# Patient Record
Sex: MALE | Race: WHITE | NOT HISPANIC OR LATINO | Employment: OTHER | URBAN - NONMETROPOLITAN AREA
[De-identification: names, ages, dates, MRNs, and addresses within clinical notes are randomized per-mention and may not be internally consistent; named-entity substitution may affect disease eponyms.]

---

## 2023-05-25 ENCOUNTER — OFFICE VISIT (OUTPATIENT)
Dept: FAMILY MEDICINE | Facility: OTHER | Age: 53
End: 2023-05-25
Attending: FAMILY MEDICINE
Payer: COMMERCIAL

## 2023-05-25 ENCOUNTER — HOSPITAL ENCOUNTER (OUTPATIENT)
Dept: GENERAL RADIOLOGY | Facility: OTHER | Age: 53
Discharge: HOME | End: 2023-05-25
Attending: FAMILY MEDICINE
Payer: COMMERCIAL

## 2023-05-25 VITALS
SYSTOLIC BLOOD PRESSURE: 132 MMHG | OXYGEN SATURATION: 98 % | TEMPERATURE: 96.6 F | WEIGHT: 225 LBS | DIASTOLIC BLOOD PRESSURE: 88 MMHG | HEART RATE: 58 BPM | RESPIRATION RATE: 16 BRPM

## 2023-05-25 DIAGNOSIS — M47.818 ARTHROPATHY OF RIGHT SACROILIAC JOINT: Primary | ICD-10-CM

## 2023-05-25 DIAGNOSIS — M62.830 LUMBAR PARASPINAL MUSCLE SPASM: ICD-10-CM

## 2023-05-25 DIAGNOSIS — Z87.19 HISTORY OF GASTROINTESTINAL ULCER: ICD-10-CM

## 2023-05-25 PROCEDURE — 72202 X-RAY EXAM SI JOINTS 3/> VWS: CPT

## 2023-05-25 PROCEDURE — 99215 OFFICE O/P EST HI 40 MIN: CPT | Performed by: FAMILY MEDICINE

## 2023-05-25 RX ORDER — OMEPRAZOLE/SODIUM BICARBONATE 20MG-1.1G
1 CAPSULE ORAL
COMMUNITY
End: 2024-08-20

## 2023-05-25 RX ORDER — CYCLOBENZAPRINE HCL 10 MG
10 TABLET ORAL
Qty: 30 TABLET | Refills: 1 | Status: SHIPPED | OUTPATIENT
Start: 2023-05-25 | End: 2024-08-20

## 2023-05-25 ASSESSMENT — PAIN SCALES - GENERAL: PAINLEVEL: MODERATE PAIN (5)

## 2023-05-25 NOTE — PROGRESS NOTES
Sports Medicine Office Note    HPI:  52-year-old male coming in for evaluation of back pain.  He has had back pain intermittently for several years.  He works in construction/excavating and reports that his job definitely plays into his pain.  Since January 2023 his pain has become much worse.  He reports right-sided lumbar back pain.  This pain is particularly bothersome in the morning.  After he moves for 10-15 minutes his symptoms will slowly improve.  He rates his pain at 4-7/10.  He characterizes the pain as sharp and stabbing.  He has difficulty with sleeping, sitting, standing, and going up stairs.  He is able to do all of the lifting and physical labor required for his job but does note that certain activities will cause increased pain.  He has tried rest, stretching, Biofreeze, Aleve, and massage.  He does report some associated radicular pain.  This is intermittent and involves mostly the right lower extremity, mostly over the anterior thigh, not extending below the knee.      EXAM:  /88 (BP Location: Right arm, Patient Position: Sitting, Cuff Size: Adult Large)   Pulse 58   Temp (!) 96.6  F (35.9  C) (Temporal)   Resp 16   Wt 102.1 kg (225 lb)   SpO2 98%   MUSCULOSKELETAL EXAM:  LOW BACK  Inspection:  -No gross deformity  -No significant scoliosis, kyphosis, or lordosis  -No bruising or swelling  -Able to walk on heel and toes  -Scars:  None    Tenderness to palpation of the:  -Lower thoracic spine:  Negative  -Upper lumbar spine: Mild pain  -Lower lumbar spine:  Negative  -Sacral spine:  Negative  -Left lumbar paraspinal musculature:  Negative  -Right lumbar paraspinal musculature:  Negative  -Left SI joint:  Negative  -Right SI joint: Mild pain  -Left gluteal musculature:  Negative  -Right gluteal musculature:  Negative  -Left greater trochanter:  Negative  -Right greater trochanter:  Negative  -Left anterior hip:  Negative  -Right anterior hip:  Negative    Range of Motion:  -Forward  flexion:  60 with pain at endrange  -Extension:  10 with pain at endrange    Strength:  -Left hip flexion:  5/5  -Right hip flexion:  5/5  -Left hip abduction:  5/5  -Right hip abduction:  5/5  -Left hip adduction:  5/5  -Right hip adduction:  5/5  -Left knee extension:  5/5  -Right knee extension:  5/5  -Left knee flexion:  5/5  -Right knee flexion:  5/5  -Left ankle plantar flexion:  5/5  -Right ankle plantar flexion:  5/5  -Left ankle dorsiflexion:  5/5  -Right ankle dorsiflexion:  5/5  -Left hallux dorsiflexion:  5/5  -Right hallux dorsiflexion:  5/5    Sensation:  -Intact sensation to light touch in the L2-S1 dermatomes    Reflexes:  -Patellar:  Symmetric bilaterally  -Achilles:  Symmetric bilaterally    Special tests:  -Active forward flexion: Painful at endrange  -Active extension: Painful at endrange  -Active side-bending:  Nonpainful bilaterally  -Active twisting: Painful on the right  -Facet loading: Painful on the right  -MARISSA: Painful on the right with bilateral testing  -Stinchfield test:  Negative bilaterally  -Straight leg raise: Negative bilaterally    MUSCULOSKELETAL EXAM:  SACROILIAC JOINT  MARISSA/Popeye test: Positive  Thigh thrust test:  Negative  Compression test: Weakly positive  Distraction test:  Negative  Gaenslen test:  Negative  Andreia finger sign: Positive      IMAGIN2023: 3 view SI joint x-ray  - No fracture, dislocation, or bony lesion.  No significant degenerative changes of the bilateral SI joints.  No significant arthritis changes of the lower lumbar spine as able to be visualized on these radiographs.      ASSESSMENT/PLAN:  Diagnoses and all orders for this visit:  Arthropathy of right sacroiliac joint  -     XR Sacroiliac Joint G/E 3 Views  -     Physical Therapy Referral; Future  Lumbar paraspinal muscle spasm  -     cyclobenzaprine (FLEXERIL) 10 MG tablet; Take 1 tablet (10 mg) by mouth nightly as needed for muscle spasms  -     Physical Therapy Referral;  Future  History of gastrointestinal ulcer    52-year-old male with right SI joint arthropathy with subsequent paraspinal muscle spasm.  He does endorse some mild radicular symptoms but these are intermittent, mild in description, and no exam abnormalities are noted.  X-rays were performed in the office today and personally reviewed by me with the findings as demonstrated above by my interpretation.  We discussed treatment options which include home exercises, physical therapy, topical creams, patches, oral medications, muscle relaxers, and injections.  We will start with less invasive interventions and advance as symptoms necessitate  - Handout given with home exercises for muscle spasm  - Referral placed to physical therapy, patient will establish if home exercises are not providing desired results  - Cyclobenzaprine 10 mg nightly as needed  - Discussed using OTC lidocaine 4% patches (12 hours on, 12 hours off)  - Okay to continue to use Biofreeze  - Use NSAIDs sparingly given history of ulcer  - Follow-up as needed if the above interventions are failing to provide desired symptom relief, consider injection with radiology      Gunner Sales MD  5/25/2023  7:51 AM    Total time spent with this patient was 46 minutes which included chart review, visualization and independent interpretation of images, time spent with the patient, and documentation.    Procedure time:  0 minute(s)

## 2023-07-10 ENCOUNTER — THERAPY VISIT (OUTPATIENT)
Dept: PHYSICAL THERAPY | Facility: OTHER | Age: 53
End: 2023-07-10
Attending: FAMILY MEDICINE
Payer: COMMERCIAL

## 2023-07-10 DIAGNOSIS — M62.830 LUMBAR PARASPINAL MUSCLE SPASM: ICD-10-CM

## 2023-07-10 DIAGNOSIS — M47.818 ARTHROPATHY OF RIGHT SACROILIAC JOINT: ICD-10-CM

## 2023-07-10 PROBLEM — M46.1 ARTHRITIS OF SACROILIAC JOINT (H): Status: ACTIVE | Noted: 2023-07-10

## 2023-07-10 PROCEDURE — 97110 THERAPEUTIC EXERCISES: CPT | Mod: GP

## 2023-07-10 PROCEDURE — 97161 PT EVAL LOW COMPLEX 20 MIN: CPT | Mod: GP

## 2023-07-10 NOTE — PROGRESS NOTES
PHYSICAL THERAPY EVALUATION  Type of Visit: Evaluation    See electronic medical record for Abuse and Falls Screening details.    Subjective      Presenting condition or subjective complaint:   Patient reports he started having SI pain in January -- more so on the R but both will cause trouble. States it got progressively worse and was having numbness/tingling down the back of his leg prior to see MD Domenico. Was prescribed stretches which states have helped but still getting pain with prolonged position and standing, repetitive movements. Patient points to R SI region, R greater trochanter, and L groin for pain points. Patient works as an  and is in/out of machinery all day long. States he has had to limit how active he is because of the pain. Today, sitting rest pain is 1/10. Was taking aleve but no longer taking because upset stomach, reports he hasn't used heat or cold. Denies numbness/tingling currently, denies muscle spasms and change in bowel/bladder. Reports no increased pain with carrying objects unless it is repetitive. Has been doing massage therapy and helps a little x1 month.     Date of onset: 01/01/23    Relevant medical history:   arthritis  Dates & types of surgery:   hand surgery 9 years ago    Prior diagnostic imaging/testing results:     XR SACROILIAC JOINT G/E 3 VIEWS     HISTORY: Arthropathy of right sacroiliac joint .     COMPARISON: None.     TECHNIQUE: 3 views of the sacrum.     FINDINGS:     Mild bilateral SI joint osteophytosis is seen without ankylosis or  subchondral erosion. There is mild facet hypertrophy at L5-S1. There  is mild bilateral femoral head osteophytosis.                                                                      IMPRESSION:      Multifocal degenerative changes.    Prior therapy history for the same diagnosis, illness or injury:     no    Prior Level of Function   Transfers: Independent  Ambulation: Independent  ADL: Independent  IADL: Ind  "with all    Living Environment  Social support:   wife  Type of home:   2-story  Stairs to enter the home:       none  Ramp:     Stairs inside the home:       full flight  Help at home:    Equipment owned:   none    Employment:     self-employed machinest  Hobbies/Interests:      Patient goals for therapy:   \"so I don't hurt\"    Pain assessment: Pain present  See objective evaluation for additional pain details     Objective   LUMBAR SPINE EVALUATION  PAIN: Pain Level at Rest: 1/10  Pain Level with Use: 8/10  Pain Location: lumbar spine  Pain Quality: Aching, Sharp and Shooting  Pain Frequency: intermittent  Pain is Worst: nighttime or sore in the AM but able to work it out  Pain is Exacerbated By: standing, repetitive motion  Pain is Relieved By: rest and stretch  INTEGUMENTARY (edema, incisions): WNL  POSTURE: WNL  GAIT:   Weightbearing Status: WBAT  Assistive Device(s): None  Gait Deviations: Base of support increased  BALANCE/PROPRIOCEPTION: WNL  WEIGHTBEARING ALIGNMENT: WNL  NON-WEIGHTBEARING ALIGNMENT: WNL   ROM:   (Degrees) Left AROM Left PROM  Right AROM Right PROM   Hip Flexion limited  limited    Hip Extension limited  limited    Hip Abduction       Hip Adduction       Hip Internal Rotation limited  limited    Hip External Rotation       Knee Flexion WNL  WNL    Knee Extension WNL  WNL    Lumbar Side glide     Lumbar Flexion WNL   Lumbar Extension WNL +feels like \"good stretch\"   Pain: denies pain with lumbar ROM screen  End feel:     PELVIC/SI SCREEN: WNL  STRENGTH: B hips 5/5 with MMT in all directions; repeated SLR with shakiness/fatigue after 6 reps  MYOTOMES:    Left Right   T12-L3 (Hip Flexion) 5 5   L2-4 (Quads)  5 5   L4 (Ankle DF) 5 5   L5 (Great Toe Ext) 5 5   S1 (Toe Raise) 5 5     NEURAL TENSION:   FLEXIBILITY: Decreased hip IR L, Decreased piriformis L, Decreased hip flexors L, Decreased hamstrings L, Decreased hip IR R, Decreased piriformis R, Decreased hip flexors R, Decreased hamstrings " R  LUMBAR/HIP Special Tests:    Left Right   MARISSA Negative  Negative    FADIR/Labrum/VENKATESH Negative  Negative    Femoral Nerve     Chayo's     Piriformis     Quadrant Testing     SLR positive for greater trochanter pain Negative    Slump     Stork with Extension     Sumeet positive ER for femur positive ER of femur           PELVIS/SI SPECIAL TESTS:    Left Right Additional Notes   ASLR      Gaenslen's Test      Pelvic Compression      Pelvic Gapping      Sacral Thrust      Thigh Thrust        SIJ Palpatory Pelvic position assessed and WNL   Standing forward bend (first/farthest superior movement, note side) WFL   Sitting forward bend (first/farthest superior movement, note side) WFL   Pubic symphysis Superior:  Inferior:      FUNCTIONAL TESTS:   PALPATION: pain posterior to greater trochanter on the R (greater than L); denies pain with palpation of piriformis and lumbar palpation; + for discomfort B SI joints  SPINAL SEGMENTAL CONCLUSIONS: WNL; mobile PA L3-S1 without increased pain    Assessment & Plan   CLINICAL IMPRESSIONS   Medical Diagnosis: M47.818 (ICD-10-CM) - Arthropathy of right sacroiliac joint  M62.830 (ICD-10-CM) - Lumbar paraspinal muscle spasm    Treatment Diagnosis: B hip and SI pain   Impression/Assessment: Patient is a 52 year old male presenting with R sided SI/hip pain and L hip pain. Patient presents with limited ROM in B hips but symmetrical strength; weakness with repeat activity and tenderness to palpation at the greater trochanter and SI joint B. Patient currently limited in standing tolerance and completing repetitive movements necessary for work. POC to address strength/length deficits and activity modification along with core stabilization.    Clinical Decision Making (Complexity):   Clinical Presentation: Stable/Uncomplicated  Clinical Presentation Rationale: based on medical and personal factors listed in PT evaluation  Clinical Decision Making (Complexity): Low complexity    PLAN OF  CARE  Treatment Interventions:  Modalities: Cryotherapy, E-stim, Hot Pack, Ultrasound  Interventions: Gait Training, Manual Therapy, Neuromuscular Re-education, Therapeutic Activity, Therapeutic Exercise    Long Term Goals     PT Goal 1  Goal Identifier: pain  Goal Description: Patient will reports decreased pain to 2/10 after work day within 6 weeks  Target Date: 08/21/23  PT Goal 2  Goal Identifier: standing tolerance  Goal Description: Patient will reports standing tolerance of >1 hour prior to requiring seated rest break due to pain within 6 weeks  Target Date: 08/21/23  PT Goal 3  Goal Identifier: HEP  Goal Description: Patient will be independent with HEP and appropriate progressions within 2 weeks  Target Date: 07/24/23      Frequency of Treatment: 2x week progressingn to 1x week  Duration of Treatment: for 6 weeks    Recommended Referrals to Other Professionals:   Education Assessment:   Learner/Method: Patient;Listening;Reading;Demonstration    Risks and benefits of evaluation/treatment have been explained.   Patient/Family/caregiver agrees with Plan of Care.     Evaluation Time:     PT Eval, Low Complexity Minutes (11687): 30      Signing Clinician: Margi Avery PT

## 2023-07-14 ENCOUNTER — THERAPY VISIT (OUTPATIENT)
Dept: PHYSICAL THERAPY | Facility: OTHER | Age: 53
End: 2023-07-14
Attending: FAMILY MEDICINE
Payer: COMMERCIAL

## 2023-07-14 DIAGNOSIS — M47.818 ARTHROPATHY OF RIGHT SACROILIAC JOINT: Primary | ICD-10-CM

## 2023-07-14 PROCEDURE — 97110 THERAPEUTIC EXERCISES: CPT | Mod: GP

## 2023-07-24 ENCOUNTER — THERAPY VISIT (OUTPATIENT)
Dept: PHYSICAL THERAPY | Facility: OTHER | Age: 53
End: 2023-07-24
Attending: FAMILY MEDICINE
Payer: COMMERCIAL

## 2023-07-24 DIAGNOSIS — M47.818 ARTHROPATHY OF RIGHT SACROILIAC JOINT: Primary | ICD-10-CM

## 2023-07-24 PROCEDURE — 97110 THERAPEUTIC EXERCISES: CPT | Mod: GP

## 2023-07-27 ENCOUNTER — THERAPY VISIT (OUTPATIENT)
Dept: PHYSICAL THERAPY | Facility: OTHER | Age: 53
End: 2023-07-27
Attending: FAMILY MEDICINE
Payer: COMMERCIAL

## 2023-07-27 DIAGNOSIS — M47.818 ARTHROPATHY OF RIGHT SACROILIAC JOINT: Primary | ICD-10-CM

## 2023-07-27 PROCEDURE — 97110 THERAPEUTIC EXERCISES: CPT | Mod: GP

## 2023-07-31 ENCOUNTER — THERAPY VISIT (OUTPATIENT)
Dept: PHYSICAL THERAPY | Facility: OTHER | Age: 53
End: 2023-07-31
Attending: FAMILY MEDICINE
Payer: COMMERCIAL

## 2023-07-31 DIAGNOSIS — M47.818 ARTHROPATHY OF RIGHT SACROILIAC JOINT: Primary | ICD-10-CM

## 2023-07-31 PROCEDURE — 97110 THERAPEUTIC EXERCISES: CPT | Mod: GP

## 2023-07-31 NOTE — PROGRESS NOTES
"   07/31/23 0500   Appointment Info   Signing clinician's name / credentials Margi Avery, PT   Visits Used 5   Medical Diagnosis M47.818 (ICD-10-CM) - Arthropathy of right sacroiliac joint  M62.830 (ICD-10-CM) - Lumbar paraspinal muscle spasm   PT Tx Diagnosis B hip and SI pain   Progress Note/Certification   Onset of illness/injury or Date of Surgery 01/01/23   Therapy Frequency 2x week progressingn to 1x week   Predicted Duration for 6 weeks   GOALS   PT Goals 2;3   PT Goal 1   Goal Identifier pain   Goal Description Patient will reports decreased pain to 2/10 after work day within 6 weeks   Goal Progress Patient reports pain variable is 4/10 today; but was 7/10 yesterday after activity   Target Date 08/21/23   PT Goal 2   Goal Identifier standing tolerance   Goal Description Patient will reports standing tolerance of >1 hour prior to requiring seated rest break due to pain within 6 weeks   Goal Progress Patient reports he will still have pain with standing/walking/working but continues to push himself through the task he needs to do   Target Date 08/21/23   Date Met 07/31/23   PT Goal 3   Goal Identifier HEP   Goal Description Patient will be independent with HEP and appropriate progressions within 2 weeks   Goal Progress Patient continues to perform stretches/strengthening exercises   Target Date 07/24/23   Date Met 07/31/23   Subjective Report   Subjective Report Patient reports he was sore from staining/working over the week -- reports \"I was pretty miserable yesterday\". Patient reports overall he hasn't noticed a difference in function/pain with physical therapy exercises.   Treatment Interventions (PT)   Interventions Therapeutic Procedure/Exercise   Therapeutic Procedure/Exercise   Therapeutic Procedures: strength, endurance, ROM, flexibillity minutes (11259) 45   Ther Proc 1 SciFit xL3 x5 min; lower trunk rotation x10 reps x5 seconds B; figure 4 stretch with crossover x5 seconds x5 reps B; SKTC x5 seconds " "x10 reps; bridges x10 reps with 5 second hold to fatigue; clam shells B to fatigue; trunk stabilization with Blue TB and chest press standing x10 reps B; trunk stabilization with blue TB in chest press with alternating marches and cues to keep hip/shoulders in align (not allow trunk rotation) x15 reps B sides; side stepping with RTB at ankle x length of hallway x4 reps   Skilled Intervention education on POC, goals of therapy and expectations, HEP   Patient Response/Progress reports no increased pain with stretches/exercises but sore muscles with effort to fatigue   Education   Learner/Method Patient;Listening;Reading;Demonstration   Plan   Home program lower trunk rotation, figure 4 stretch; bridge, clam shell, TB trunk stabilization   Plan for next session patient to continue HEP - will follow up with MD for next steps; discharged from PT at this time.   Total Session Time   Timed Code Treatment Minutes 45   Total Treatment Time (sum of timed and untimed services) 45         DISCHARGE  Reason for Discharge: No further expectation of progress.  Patient chooses to discontinue therapy.  Patient reports he continues to do \"what I need to do\" but continues to have low back and B hip pain with activity that is not alleviating with stretching/strengthening and core stabilization exercises.    Equipment Issued: RTB, BTB    Discharge Plan: Patient to continue home program.  Patient to return to referring provider    Referring Provider:  Gunner Acuña MD    "

## 2023-08-09 ENCOUNTER — OFFICE VISIT (OUTPATIENT)
Dept: FAMILY MEDICINE | Facility: OTHER | Age: 53
End: 2023-08-09
Attending: FAMILY MEDICINE
Payer: COMMERCIAL

## 2023-08-09 VITALS
WEIGHT: 225.6 LBS | TEMPERATURE: 96.3 F | RESPIRATION RATE: 16 BRPM | DIASTOLIC BLOOD PRESSURE: 80 MMHG | SYSTOLIC BLOOD PRESSURE: 122 MMHG | HEART RATE: 73 BPM | OXYGEN SATURATION: 99 %

## 2023-08-09 DIAGNOSIS — M47.818 ARTHROPATHY OF RIGHT SACROILIAC JOINT: ICD-10-CM

## 2023-08-09 DIAGNOSIS — M47.818 ARTHROPATHY OF LEFT SACROILIAC JOINT: ICD-10-CM

## 2023-08-09 DIAGNOSIS — M25.552 GREATER TROCHANTERIC PAIN SYNDROME OF LEFT LOWER EXTREMITY: ICD-10-CM

## 2023-08-09 DIAGNOSIS — M62.830 LUMBAR PARASPINAL MUSCLE SPASM: ICD-10-CM

## 2023-08-09 DIAGNOSIS — M25.551 GREATER TROCHANTERIC PAIN SYNDROME OF RIGHT LOWER EXTREMITY: Primary | ICD-10-CM

## 2023-08-09 PROCEDURE — 250N000011 HC RX IP 250 OP 636: Mod: JZ | Performed by: FAMILY MEDICINE

## 2023-08-09 PROCEDURE — 250N000009 HC RX 250: Performed by: FAMILY MEDICINE

## 2023-08-09 PROCEDURE — 99214 OFFICE O/P EST MOD 30 MIN: CPT | Mod: 25 | Performed by: FAMILY MEDICINE

## 2023-08-09 PROCEDURE — 20610 DRAIN/INJ JOINT/BURSA W/O US: CPT | Performed by: FAMILY MEDICINE

## 2023-08-09 RX ORDER — LIDOCAINE HYDROCHLORIDE 10 MG/ML
5 INJECTION, SOLUTION EPIDURAL; INFILTRATION; INTRACAUDAL; PERINEURAL ONCE
Status: COMPLETED | OUTPATIENT
Start: 2023-08-09 | End: 2023-08-09

## 2023-08-09 RX ORDER — DEXAMETHASONE SODIUM PHOSPHATE 10 MG/ML
10 INJECTION, SOLUTION INTRAMUSCULAR; INTRAVENOUS ONCE
Status: COMPLETED | OUTPATIENT
Start: 2023-08-09 | End: 2023-08-09

## 2023-08-09 RX ADMIN — DEXAMETHASONE SODIUM PHOSPHATE 10 MG: 10 INJECTION, SOLUTION INTRAMUSCULAR; INTRAVENOUS at 11:16

## 2023-08-09 RX ADMIN — LIDOCAINE HYDROCHLORIDE 5 ML: 10 INJECTION, SOLUTION INFILTRATION; PERINEURAL at 11:17

## 2023-08-09 RX ADMIN — LIDOCAINE HYDROCHLORIDE 5 ML: 10 INJECTION, SOLUTION INFILTRATION; PERINEURAL at 11:16

## 2023-08-09 ASSESSMENT — PAIN SCALES - GENERAL: PAINLEVEL: MILD PAIN (2)

## 2023-08-09 NOTE — PROGRESS NOTES
Sports Medicine Office Note    HPI:  52-year-old male coming in for follow-up evaluation of lower back pain and bilateral hip pain.  He was last seen in this office on 5/25.  At that time he was referred to physical therapy for lumbar paraspinal muscle spasm and SI joint pain.  He reports 15% improvement in his symptoms.  His current pain is 3-7/10.  He characterizes the pain as dull, stabbing, and aching.  On today's evaluation he seems to have more pain at the lateral hip bilaterally.  No new injuries.      EXAM:  /80 (BP Location: Right arm, Patient Position: Sitting, Cuff Size: Adult Regular)   Pulse 73   Temp (!) 96.3  F (35.7  C) (Temporal)   Resp 16   Wt 102.3 kg (225 lb 9.6 oz)   SpO2 99%   MUSCULOSKELETAL EXAM:  LOW BACK  Inspection:  -No gross deformity  -No significant scoliosis, kyphosis, or lordosis  -No bruising or swelling  -Able to walk on heel and toes  -Scars:  None    Tenderness to palpation of the:  -Lower thoracic spine:  Negative  -Upper lumbar spine:  Negative  -Lower lumbar spine:  Negative  -Sacral spine:  Negative  -Left lumbar paraspinal musculature: Mild pain  -Right lumbar paraspinal musculature: Mild pain  -Left SI joint: Mild pain  -Right SI joint: Mild pain  -Left gluteal musculature:  Negative  -Right gluteal musculature:  Negative  -Left greater trochanter: Positive  -Right greater trochanter: Positive  -Left anterior hip:  Negative  -Right anterior hip:  Negative    Range of Motion:  -Forward flexion:  70  -Extension:  15  -Passive left hip flexion:  110  -Passive right hip flexion:  110  -Passive left hip internal rotation:  5  -Passive left hip external rotation:  60  -Passive right hip internal rotation:  10  -Passive right hip external rotation:  50    Strength:  -Left hip flexion:  5/5  -Right hip flexion:  5/5  -Left hip abduction:  5/5  -Right hip abduction:  5/5  -Left hip adduction:  5/5  -Right hip adduction:  5/5  -Left knee extension:  5/5  -Right knee  extension:  5/5  -Left knee flexion:  5/5  -Right knee flexion:  5/5  -Left ankle plantar flexion:  5/5  -Right ankle plantar flexion:  5/5  -Left ankle dorsiflexion:  5/5  -Right ankle dorsiflexion:  5/5    Sensation:  -Intact sensation to light touch in the L2-S1 dermatomes    Special tests:  -Active forward flexion:  Nonpainful  -Active extension:  Nonpainful  -Active side-bending:  Nonpainful bilaterally  -Active twisting:  Nonpainful bilaterally  -Facet loading:  Nonpainful bilaterally  -Stork test:  Nonpainful bilaterally  -MARISSA:  Negative bilaterally  -Stinchfield test:  Negative bilaterally    MUSCULOSKELETAL EXAM:  RIGHT SACROILIAC JOINT  MARISSA/Popeye test:  Negative  Thigh thrust test:  Negative  Compression test:  Negative  Distraction test:  Negative  Gaenslen test:  Negative  Andreia finger sign:  Negative      IMAGIN2023: 3 view SI joint x-ray  - No fracture, dislocation, or bony lesion.  No significant degenerative changes of the bilateral SI joints.  No significant arthritis changes of the lower lumbar spine as able to be visualized on these radiographs.      ASSESSMENT/PLAN:  Diagnoses and all orders for this visit:  Greater trochanteric pain syndrome of right lower extremity  -     lidocaine (PF) (XYLOCAINE) 1 % injection 5 mL  -     dexAMETHasone PF (DECADRON) injection 10 mg  -     DRAIN/INJECT LARGE JOINT/BURSA  Greater trochanteric pain syndrome of left lower extremity  -     dexAMETHasone PF (DECADRON) injection 10 mg  -     lidocaine (PF) (XYLOCAINE) 1 % injection 5 mL  -     DRAIN/INJECT LARGE JOINT/BURSA  Arthropathy of right sacroiliac joint  Arthropathy of left sacroiliac joint  Lumbar paraspinal muscle spasm    52-year-old male with bilateral low back and hip pain.  On today's evaluation his most predominant pain seems to be from the bilateral trochanteric regions.  He also has some tenderness to the lumbar paraspinal musculature as well as the bilateral SI joints.  No  concerning findings for radicular pain.  X-rays from 5/25 were again personally reviewed in the office with the findings as demonstrated above by my interpretation.  He has been undergoing a course of physical therapy and is not getting the relief he desires.  We discussed next steps to include CSI versus advanced imaging.  As his pain seems to be coming from the greater trochanteric region, I feel that this would be the best area to target on today's evaluation.  After reviewing the risks/benefits, the patient elects to proceed with bilateral trochanteric bursa injections.  See procedure notes below:    PROCEDURE:  Injection of the Bilateral Greater Trochanteric Bursae     PROCEDURAL PAUSE:    A procedural pause was conducted to verify:  correct patient identity, procedure to be performed, and as applicable, correct side, site, and correct patient position.      INFORMED CONSENT:   I discussed the risks, possible benefits, and alternatives to injection.  Following denial of allergy and review of potential side effects and complications (including but not necessarily limited to infection, allergic reaction, fat necrosis, skin depigmentation, local tissue breakdown, systemic effects of corticosteroids, elevation of blood glucose, injury to soft tissue and/or nerves, and seizure), all questions were answered, and consent was given to proceed.  Patient verbalized understanding.      PROCEDURE DETAILS:    Side and site were marked, and a time out was performed to verify appropriate patient identifiers.  Following this the right lateral hip was prepped with chlorhexidine.  Utilizing a 3-inch spinal needle the greater trochanteric bursa was injected using 5mL of 1% Lidocaine and 1mL dexamethasone (10mg/mL).  0mL was wasted.    Side and site were marked, and a time out was performed to verify appropriate patient identifiers.  Following this the left lateral hip was prepped with chlorhexidine.  Utilizing a 3-inch spinal  needle the greater trochanteric bursa was injected using 5mL of 1% Lidocaine and 1mL dexamethasone (10mg/mL).  0mL was wasted.     The patient tolerated the procedure without complication and was discharged in good condition after a short observation period.  The patient was instructed to contact me regarding any questions pertaining to the procedure.      DIAGNOSIS:    -Successful injections of the bilateral greater trochanteric bursae without immediate complication      PLAN:   -Post-procedure care reviewed, including avoiding submersion of the injection site for 48 hours   -Return precautions reviewed for signs/symptoms that would be concerning for infection   -The patient was instructed to ice and take APAP this evening if needed  -Continue with physical therapy and home exercises  -OTC analgesics as needed  -Return to clinic as needed  -If hip symptoms improve with the above injections but he still has back pain, consider MRI followed by SI joint injections  -If all symptoms remain bothersome, consider MRI (hips versus low back depending on which is more symptomatic)      Gunner Sales MD  8/9/2023  10:31 AM    Total time spent with this patient was 37 minutes which included chart review, visualization and independent interpretation of images, time spent with the patient, and documentation.    Procedure time:  5 minute(s)

## 2023-08-09 NOTE — NURSING NOTE
Patient is here today to follow up on bilateral hip and back pain.  States after therapy about 15%

## 2024-08-20 ENCOUNTER — OFFICE VISIT (OUTPATIENT)
Dept: FAMILY MEDICINE | Facility: OTHER | Age: 54
End: 2024-08-20
Attending: PHYSICIAN ASSISTANT
Payer: COMMERCIAL

## 2024-08-20 VITALS
RESPIRATION RATE: 14 BRPM | HEIGHT: 75 IN | HEART RATE: 62 BPM | OXYGEN SATURATION: 96 % | WEIGHT: 229.2 LBS | DIASTOLIC BLOOD PRESSURE: 76 MMHG | BODY MASS INDEX: 28.5 KG/M2 | TEMPERATURE: 95.7 F | SYSTOLIC BLOOD PRESSURE: 136 MMHG

## 2024-08-20 DIAGNOSIS — Z12.11 SPECIAL SCREENING FOR MALIGNANT NEOPLASMS, COLON: ICD-10-CM

## 2024-08-20 DIAGNOSIS — Z12.5 SCREENING FOR PROSTATE CANCER: ICD-10-CM

## 2024-08-20 DIAGNOSIS — Z00.00 ROUTINE HISTORY AND PHYSICAL EXAMINATION OF ADULT: Primary | ICD-10-CM

## 2024-08-20 LAB
ALBUMIN SERPL BCG-MCNC: 4.6 G/DL (ref 3.5–5.2)
ALP SERPL-CCNC: 48 U/L (ref 40–150)
ALT SERPL W P-5'-P-CCNC: 61 U/L (ref 0–70)
ANION GAP SERPL CALCULATED.3IONS-SCNC: 9 MMOL/L (ref 7–15)
AST SERPL W P-5'-P-CCNC: 48 U/L (ref 0–45)
BASOPHILS # BLD AUTO: 0.1 10E3/UL (ref 0–0.2)
BASOPHILS NFR BLD AUTO: 1 %
BILIRUB SERPL-MCNC: 1 MG/DL
BUN SERPL-MCNC: 11 MG/DL (ref 6–20)
CALCIUM SERPL-MCNC: 9.7 MG/DL (ref 8.8–10.4)
CHLORIDE SERPL-SCNC: 101 MMOL/L (ref 98–107)
CHOLEST SERPL-MCNC: 283 MG/DL
CREAT SERPL-MCNC: 0.91 MG/DL (ref 0.67–1.17)
EGFRCR SERPLBLD CKD-EPI 2021: >90 ML/MIN/1.73M2
EOSINOPHIL # BLD AUTO: 0.2 10E3/UL (ref 0–0.7)
EOSINOPHIL NFR BLD AUTO: 3 %
ERYTHROCYTE [DISTWIDTH] IN BLOOD BY AUTOMATED COUNT: 12.8 % (ref 10–15)
FASTING STATUS PATIENT QL REPORTED: YES
FASTING STATUS PATIENT QL REPORTED: YES
GLUCOSE SERPL-MCNC: 103 MG/DL (ref 70–99)
HCO3 SERPL-SCNC: 30 MMOL/L (ref 22–29)
HCT VFR BLD AUTO: 44.4 % (ref 40–53)
HDLC SERPL-MCNC: 81 MG/DL
HGB BLD-MCNC: 14.7 G/DL (ref 13.3–17.7)
IMM GRANULOCYTES # BLD: 0 10E3/UL
IMM GRANULOCYTES NFR BLD: 0 %
LDLC SERPL CALC-MCNC: 174 MG/DL
LYMPHOCYTES # BLD AUTO: 1.6 10E3/UL (ref 0.8–5.3)
LYMPHOCYTES NFR BLD AUTO: 26 %
MCH RBC QN AUTO: 30.8 PG (ref 26.5–33)
MCHC RBC AUTO-ENTMCNC: 33.1 G/DL (ref 31.5–36.5)
MCV RBC AUTO: 93 FL (ref 78–100)
MONOCYTES # BLD AUTO: 0.7 10E3/UL (ref 0–1.3)
MONOCYTES NFR BLD AUTO: 12 %
NEUTROPHILS # BLD AUTO: 3.5 10E3/UL (ref 1.6–8.3)
NEUTROPHILS NFR BLD AUTO: 58 %
NONHDLC SERPL-MCNC: 202 MG/DL
NRBC # BLD AUTO: 0 10E3/UL
NRBC BLD AUTO-RTO: 0 /100
PLATELET # BLD AUTO: 164 10E3/UL (ref 150–450)
POTASSIUM SERPL-SCNC: 4.8 MMOL/L (ref 3.4–5.3)
PROT SERPL-MCNC: 7.5 G/DL (ref 6.4–8.3)
PSA SERPL DL<=0.01 NG/ML-MCNC: 0.82 NG/ML (ref 0–3.5)
RBC # BLD AUTO: 4.78 10E6/UL (ref 4.4–5.9)
SODIUM SERPL-SCNC: 140 MMOL/L (ref 135–145)
TRIGL SERPL-MCNC: 139 MG/DL
WBC # BLD AUTO: 6.1 10E3/UL (ref 4–11)

## 2024-08-20 PROCEDURE — G0103 PSA SCREENING: HCPCS | Mod: ZL | Performed by: PHYSICIAN ASSISTANT

## 2024-08-20 PROCEDURE — 80053 COMPREHEN METABOLIC PANEL: CPT | Mod: ZL | Performed by: PHYSICIAN ASSISTANT

## 2024-08-20 PROCEDURE — 36415 COLL VENOUS BLD VENIPUNCTURE: CPT | Mod: ZL | Performed by: PHYSICIAN ASSISTANT

## 2024-08-20 PROCEDURE — 85025 COMPLETE CBC W/AUTO DIFF WBC: CPT | Mod: ZL | Performed by: PHYSICIAN ASSISTANT

## 2024-08-20 PROCEDURE — 99396 PREV VISIT EST AGE 40-64: CPT | Performed by: PHYSICIAN ASSISTANT

## 2024-08-20 PROCEDURE — 82465 ASSAY BLD/SERUM CHOLESTEROL: CPT | Mod: ZL | Performed by: PHYSICIAN ASSISTANT

## 2024-08-20 SDOH — HEALTH STABILITY: PHYSICAL HEALTH: ON AVERAGE, HOW MANY MINUTES DO YOU ENGAGE IN EXERCISE AT THIS LEVEL?: 60 MIN

## 2024-08-20 SDOH — HEALTH STABILITY: PHYSICAL HEALTH: ON AVERAGE, HOW MANY DAYS PER WEEK DO YOU ENGAGE IN MODERATE TO STRENUOUS EXERCISE (LIKE A BRISK WALK)?: 6 DAYS

## 2024-08-20 ASSESSMENT — PAIN SCALES - GENERAL: PAINLEVEL: NO PAIN (0)

## 2024-08-20 ASSESSMENT — SOCIAL DETERMINANTS OF HEALTH (SDOH): HOW OFTEN DO YOU GET TOGETHER WITH FRIENDS OR RELATIVES?: MORE THAN THREE TIMES A WEEK

## 2024-08-20 NOTE — PROGRESS NOTES
Preventive Care Visit  [facility] AND [facility]  Adriana Hernandez PA-C, Family Medicine  Aug 20, 2024      Assessment & Plan     1. Routine history and physical examination of adult  Up-to-date on preventative screenings.  Lab work pending as below.  Consider zoster immunization through pharmacy for better insurance coverage, patient deferred COVID-vaccine at this time.  - CBC and Differential; Future  - Comprehensive Metabolic Panel; Future  - Lipid Panel; Future  - CBC and Differential  - Comprehensive Metabolic Panel  - Lipid Panel    2. Special screening for malignant neoplasms, colon  - Colonoscopy Screening  Referral; Future    3. Screening for prostate cancer  - PSA Screen GH; Future  - PSA Screen GH      Counseling  Appropriate preventive services were addressed with this patient via screening, questionnaire, or discussion as appropriate for fall prevention, nutrition, physical activity, Tobacco-use cessation, social engagement, weight loss and cognition.  Checklist reviewing preventive services available has been given to the patient.  Reviewed patient's diet, addressing concerns and/or questions.         No follow-ups on file.    Lluvia Abraham is a 54 year old, presenting for the following:  Physical         Health Care Directive  Patient does not have a Health Care Directive or Living Will: Discussed advance care planning with patient; however, patient declined at this time.    HPI      STD concerns: No  Cholesterol/DM concerns/screening: Due  Prostate cancer screening discussed:  PSA due  Colonoscopy: None previous, due  Tobacco use: No  Immunizations: Zoster, declined COVID        8/20/2024   General Health   How would you rate your overall physical health? Good   Feel stress (tense, anxious, or unable to sleep) To some extent      (!) STRESS CONCERN      8/20/2024   Nutrition   Three or more servings of calcium each day? (!) I DON'T KNOW   Diet: Regular (no restrictions)   How  many servings of fruit and vegetables per day? (!) 2-3   How many sweetened beverages each day? 0-1            8/20/2024   Exercise   Days per week of moderate/strenous exercise 6 days   Average minutes spent exercising at this level 60 min            8/20/2024   Social Factors   Frequency of gathering with friends or relatives More than three times a week   Worry food won't last until get money to buy more No   Food not last or not have enough money for food? No   Do you have housing? (Housing is defined as stable permanent housing and does not include staying ouside in a car, in a tent, in an abandoned building, in an overnight shelter, or couch-surfing.) Yes   Are you worried about losing your housing? No   Lack of transportation? No   Unable to get utilities (heat,electricity)? No            8/20/2024   Fall Risk   Fallen 2 or more times in the past year? No   Trouble with walking or balance? No             8/20/2024   Dental   Dentist two times every year? Yes            8/20/2024   TB Screening   Were you born outside of the [place]? No            Today's PHQ-2 Score:       8/20/2024     8:14 AM   PHQ-2 ( 1999 Pf[employer])   Q1: Little interest or pleasure in doing things 0   Q2: Feeling down, depressed or hopeless 0   PHQ-2 Score 0   Q1: Little interest or pleasure in doing things Not at all   Q2: Feeling down, depressed or hopeless Not at all   PHQ-2 Score 0           8/20/2024   Substance Use   Alcohol more than 3/day or more than 7/wk Not Applicable   Do you use any other substances recreationally? No        Social History     Tobacco Use    Smoking status: Never    Smokeless tobacco: Never   Substance Use Topics    Alcohol use: Yes     Comment: occasional    Drug use: Never             8/20/2024   One time HIV Screening   Previous HIV test? No          8/20/2024   STI Screening   New sexual partner(s) since last STI/HIV test? No      ASCVD Risk   The ASCVD Risk score (Autumn TIPTON, et al., 2019) failed to  calculate for the following reasons:    The systolic blood pressure is missing    Cannot find a previous HDL lab    Cannot find a previous total cholesterol lab           Reviewed and updated as needed this visit by Provider                    No past medical history on file.  No past surgical history on file.         Objective    Exam  There were no vitals taken for this visit.   There is no height or weight on file to calculate BMI.    Physical Exam  GENERAL: alert and no distress  EYES: Eyes grossly normal to inspection, PERRL and conjunctivae and sclerae normal  RESP: lungs clear to auscultation - no rales, rhonchi or wheezes  CV: regular rate and rhythm, normal S1 S2, no S3 or S4, no murmur, click or rub, no peripheral edema  MS: no gross musculoskeletal defects noted, no edema  SKIN: no suspicious lesions or rashes  PSYCH: mentation appears normal, affect normal/bright        Signed Electronically by: Adriana Hernandez PA-C

## 2024-08-20 NOTE — NURSING NOTE
Patient presents to clinic for annual physical.  Ami Kathleen LPN ....................  8/20/2024   8:36 AM  EXT [id number]

## 2024-08-22 DIAGNOSIS — Z12.11 ENCOUNTER FOR SCREENING COLONOSCOPY: Primary | ICD-10-CM

## 2024-08-22 NOTE — TELEPHONE ENCOUNTER
Screening Questions for the Scheduling of Screening Colonoscopies   (If Colonoscopy is diagnostic, Provider should review the chart before scheduling.)  Are you younger than 45 or older than 80?  NO  Do you take aspirin or fish oil?  NO (if yes, tell patient to stop 1 week prior to Colonoscopy)  Do you take warfarin (Coumadin), clopidogrel (Plavix), apixaban (Eliquis), dabigatram (Pradaxa), rivaroxaban (Xarelto) or any blood thinner? NO  Do you take semaglutide (Ozempic or Wegovy), tirzepatide (Mounjaro or Zepbound), liraglutide (Victoza), or dulaglutide (Trulicity)? NO  Do you use oxygen or a CPAP at home?  NO  Do you have kidney disease? NO  Are you on dialysis? NO  Have you had a stroke or heart attack in the last year? NO  Have you had a stent in your heart or any blood vessel in the last year? NO  Have you had a transplant of any organ? NO  Have you had a colonoscopy or upper endoscopy (EGD) before? NO  Date of scheduled Colonoscopy. 11/05/2024  [facility]  [facility]

## 2024-08-26 RX ORDER — POLYETHYLENE GLYCOL 3350, SODIUM CHLORIDE, SODIUM BICARBONATE, POTASSIUM CHLORIDE 420; 11.2; 5.72; 1.48 G/4L; G/4L; G/4L; G/4L
4000 POWDER, FOR SOLUTION ORAL ONCE
Qty: 4000 ML | Refills: 0 | Status: SHIPPED | OUTPATIENT
Start: 2024-10-29 | End: 2024-10-29

## 2024-08-26 RX ORDER — BISACODYL 5 MG/1
TABLET, DELAYED RELEASE ORAL
Qty: 2 TABLET | Refills: 0 | Status: SHIPPED | OUTPATIENT
Start: 2024-10-29

## 2024-11-05 ENCOUNTER — ANESTHESIA EVENT (OUTPATIENT)
Dept: SURGERY | Facility: OTHER | Age: 54
End: 2024-11-05
Payer: COMMERCIAL

## 2024-11-05 ENCOUNTER — ANESTHESIA (OUTPATIENT)
Dept: SURGERY | Facility: OTHER | Age: 54
End: 2024-11-05
Payer: COMMERCIAL

## 2024-11-05 ENCOUNTER — HOSPITAL ENCOUNTER (OUTPATIENT)
Facility: OTHER | Age: 54
Discharge: HOME | End: 2024-11-05
Attending: SURGERY | Admitting: SURGERY
Payer: COMMERCIAL

## 2024-11-05 VITALS
WEIGHT: 229 LBS | BODY MASS INDEX: 28.47 KG/M2 | HEART RATE: 70 BPM | DIASTOLIC BLOOD PRESSURE: 91 MMHG | RESPIRATION RATE: 15 BRPM | SYSTOLIC BLOOD PRESSURE: 141 MMHG | OXYGEN SATURATION: 97 % | TEMPERATURE: 96.8 F | HEIGHT: 75 IN

## 2024-11-05 PROCEDURE — 258N000003 HC RX IP 258 OP 636: Performed by: NURSE ANESTHETIST, CERTIFIED REGISTERED

## 2024-11-05 PROCEDURE — 250N000009 HC RX 250: Performed by: NURSE ANESTHETIST, CERTIFIED REGISTERED

## 2024-11-05 PROCEDURE — 45378 DIAGNOSTIC COLONOSCOPY: CPT | Performed by: SURGERY

## 2024-11-05 PROCEDURE — 45378 DIAGNOSTIC COLONOSCOPY: CPT | Performed by: NURSE ANESTHETIST, CERTIFIED REGISTERED

## 2024-11-05 PROCEDURE — 999N000010 HC STATISTIC ANES STAT CODE-CRNA PER MINUTE: Performed by: SURGERY

## 2024-11-05 PROCEDURE — 250N000011 HC RX IP 250 OP 636: Performed by: NURSE ANESTHETIST, CERTIFIED REGISTERED

## 2024-11-05 PROCEDURE — G0121 COLON CA SCRN NOT HI RSK IND: HCPCS | Performed by: SURGERY

## 2024-11-05 RX ORDER — PROPOFOL 10 MG/ML
INJECTION, EMULSION INTRAVENOUS CONTINUOUS PRN
Status: DISCONTINUED | OUTPATIENT
Start: 2024-11-05 | End: 2024-11-05

## 2024-11-05 RX ORDER — PROPOFOL 10 MG/ML
INJECTION, EMULSION INTRAVENOUS PRN
Status: DISCONTINUED | OUTPATIENT
Start: 2024-11-05 | End: 2024-11-05

## 2024-11-05 RX ORDER — KETAMINE HYDROCHLORIDE 10 MG/ML
INJECTION INTRAMUSCULAR; INTRAVENOUS PRN
Status: DISCONTINUED | OUTPATIENT
Start: 2024-11-05 | End: 2024-11-05

## 2024-11-05 RX ORDER — NALOXONE HYDROCHLORIDE 0.4 MG/ML
0.4 INJECTION, SOLUTION INTRAMUSCULAR; INTRAVENOUS; SUBCUTANEOUS
Status: DISCONTINUED | OUTPATIENT
Start: 2024-11-05 | End: 2024-11-05 | Stop reason: HOSPADM

## 2024-11-05 RX ORDER — LIDOCAINE HYDROCHLORIDE 20 MG/ML
INJECTION, SOLUTION INFILTRATION; PERINEURAL PRN
Status: DISCONTINUED | OUTPATIENT
Start: 2024-11-05 | End: 2024-11-05

## 2024-11-05 RX ORDER — SODIUM CHLORIDE, SODIUM LACTATE, POTASSIUM CHLORIDE, CALCIUM CHLORIDE 600; 310; 30; 20 MG/100ML; MG/100ML; MG/100ML; MG/100ML
INJECTION, SOLUTION INTRAVENOUS CONTINUOUS
Status: DISCONTINUED | OUTPATIENT
Start: 2024-11-05 | End: 2024-11-05 | Stop reason: HOSPADM

## 2024-11-05 RX ORDER — NALOXONE HYDROCHLORIDE 0.4 MG/ML
0.2 INJECTION, SOLUTION INTRAMUSCULAR; INTRAVENOUS; SUBCUTANEOUS
Status: DISCONTINUED | OUTPATIENT
Start: 2024-11-05 | End: 2024-11-05 | Stop reason: HOSPADM

## 2024-11-05 RX ORDER — LIDOCAINE 40 MG/G
CREAM TOPICAL
Status: DISCONTINUED | OUTPATIENT
Start: 2024-11-05 | End: 2024-11-05 | Stop reason: HOSPADM

## 2024-11-05 RX ORDER — FLUMAZENIL 0.1 MG/ML
0.2 INJECTION, SOLUTION INTRAVENOUS
Status: DISCONTINUED | OUTPATIENT
Start: 2024-11-05 | End: 2024-11-05 | Stop reason: HOSPADM

## 2024-11-05 RX ADMIN — Medication 30 MG: at 13:34

## 2024-11-05 RX ADMIN — PROPOFOL 30 MG: 10 INJECTION, EMULSION INTRAVENOUS at 13:36

## 2024-11-05 RX ADMIN — LIDOCAINE HYDROCHLORIDE 60 MG: 20 INJECTION, SOLUTION INFILTRATION; PERINEURAL at 13:30

## 2024-11-05 RX ADMIN — SODIUM CHLORIDE, POTASSIUM CHLORIDE, SODIUM LACTATE AND CALCIUM CHLORIDE: 600; 310; 30; 20 INJECTION, SOLUTION INTRAVENOUS at 11:55

## 2024-11-05 RX ADMIN — PROPOFOL 160 MCG/KG/MIN: 10 INJECTION, EMULSION INTRAVENOUS at 13:31

## 2024-11-05 RX ADMIN — PROPOFOL 30 MG: 10 INJECTION, EMULSION INTRAVENOUS at 13:37

## 2024-11-05 RX ADMIN — PROPOFOL 150 MG: 10 INJECTION, EMULSION INTRAVENOUS at 13:30

## 2024-11-05 ASSESSMENT — ACTIVITIES OF DAILY LIVING (ADL)
ADLS_ACUITY_SCORE: 0

## 2024-11-05 NOTE — H&P
GENERAL SURGERY CONSULTATION NOTE    Jarad Coughlin   [address] [address]  [place] [address]  54 year old  male    Primary Care Provider:  No Ref-Primary, Physician      HPI: Jarad Coughlin presents to day surgery in need of colonoscopy for screening for colon cancer.   Jarad Coughlin denies family history of colon cancer. Patient denies change in bowel habits or blood in stools. Previous colonoscopy was enver.     REVIEW OF SYSTEMS:    GENERAL: No fevers or chills. Denies fatigue, recent weight loss.  HEENT: No sinus drainage. No changes with vision or hearing. No difficulty swallowing.   LYMPHATICS:  Noswollen nodes in axilla, neck or groin.  CARDIOVASCULAR: Denies chest pain, palpitations and dyspnea on exertion.  PULMONARY: No shortness of breath or cough. No increase in sputum production.  GI: Denies melena,bright red blood in stools. No hematemesis. No constipation or diarrhea.  : No dysuria or hematuria.  SKIN: No recent rashes or ulcers.   HEMATOLOGY:  No history of easy bruising or bleeding.  ENDOCRINE:  No history of diabetes or thyroid problems.  NEUROLOGY:  No history of seizures or headaches. No motor or sensory changes.        Patient Active Problem List   Diagnosis    History of gastrointestinal ulcer    Arthropathy of right sacroiliac joint       History reviewed. No pertinent past medical history.    History reviewed. No pertinent surgical history.    History reviewed. No pertinent family history.    Social History     Social History Narrative    Not on file       Social History     Socioeconomic History    Marital status:      Spouse name: Not on file    Number of children: Not on file    Years of education: Not on file    Highest education level: Not on file   Occupational History    Not on file   Tobacco Use    Smoking status: Never    Smokeless tobacco: Never   Vaping Use    Vaping status: Never Used   Substance and Sexual Activity    Alcohol use: Yes     Comment: occasional     Drug use: Never    Sexual activity: Not on file   Other Topics Concern    Not on file   Social History Narrative    Not on file     Social Drivers of Health     Financial Resource Strain: Low Risk  (8/20/2024)    Financial Resource Strain     Within the past 12 months, have you or your family members you live with been unable to get utilities (heat, electricity) when it was really needed?: No   Food Insecurity: Low Risk  (8/20/2024)    Food Insecurity     Within the past 12 months, did you worry that your food would run out before you got money to buy more?: No     Within the past 12 months, did the food you bought just not last and you didn t have money to get more?: No   Transportation Needs: Low Risk  (8/20/2024)    Transportation Needs     Within the past 12 months, has lack of transportation kept you from medical appointments, getting your medicines, non-medical meetings or appointments, work, or from getting things that you need?: No   Physical Activity: Sufficiently Active (8/20/2024)    Exercise Vital Sign     Days of Exercise per Week: 6 days     Minutes of Exercise per Session: 60 min   Stress: Stress Concern Present (8/20/2024)    [employer] - Occupational Stress Questionnaire     Feeling of Stress : To some extent   Social Connections: Unknown (8/20/2024)    Social Connection and Isolation Panel [NHANES]     Frequency of Communication with Friends and Family: Not on file     Frequency of Social Gatherings with Friends and Family: More than three times a week     Attends [employer] Services: Not on file     Active Member of Clubs or Organizations: Not on file     Attends Club or Organization Meetings: Not on file     Marital Status: Not on file   Interpersonal Safety: Low Risk  (11/5/2024)    Interpersonal Safety     Do you feel physically and emotionally safe where you currently live?: Yes     Within the past 12 months, have you been hit, slapped, kicked or otherwise  "physically hurt by someone?: No     Within the past 12 months, have you been humiliated or emotionally abused in other ways by your partner or ex-partner?: No   Housing Stability: Low Risk  (8/20/2024)    Housing Stability     Do you have housing? : Yes     Are you worried about losing your housing?: No       Current Facility-Administered Medications   Medication Dose Route Frequency Provider Last Rate Last Admin    lactated ringers infusion   Intravenous Continuous Gigi Mcmahon APRN CRNA 100 mL/hr at 11/05/24 1155 New Bag at 11/05/24 1155    lidocaine (LMX4) cream   Topical Q1H PRN Leandro Owens MD        lidocaine 1 % 0.1-1 mL  0.1-1 mL Other Q1H PRN Leandro Owens MD        sodium chloride (PF) 0.9% PF flush 3 mL  3 mL Intracatheter Q8H Leandro Owens MD        sodium chloride (PF) 0.9% PF flush 3 mL  3 mL Intracatheter q1 min prn Leandro Owens MD             ALLERGIES/SENSITIVITIES: No Known Allergies    PHYSICAL EXAM:     BP (!) 149/97   Pulse 80   Temp 98.3  F (36.8  C) (Tympanic)   Resp 18   Ht 1.905 m (6' 3\")   Wt 103.9 kg (229 lb)   SpO2 96%   BMI 28.62 kg/m      General Appearance:   Sitting up in bed, no apparent distress  HEENT: Pupils are equal and reactive, no scleral icterus   Heart & CV:  RRR, no murmur.  LUNGS: No increased work of breathing. Lungs are CTA B/L, no wheezing or crackles.  Abd:  soft, non-tender, no masses   Ext: no lower extremity edema   Neuro: alert and oriented, normal speech and mentation         CONSULTATION ASSESSMENT AND PLAN:    54 year old male with average risk for colon cancer.      The technical details of colonoscopy were discussed with the patient along with the risks and benefits to include bleeding, perforation and incomplete study. Jarad Coughlin demonstrated understanding and is willing to proceed.       Leandro Owens MD on 11/5/2024 at 12:16 PM      "

## 2024-11-05 NOTE — DISCHARGE INSTRUCTIONS
[facility]  Adult Discharge Orders & Instructions    ________________________________________________________________          For 12 hours after surgery  Get plenty of rest.  A responsible adult must stay with you for at least 12 hours after you leave the hospital.   You may feel lightheaded.  IF so, sit for a few minutes before standing.  Have someone help you get up.   You may have a slight fever. Call the doctor if your fever is over 101 F (38.3 C) (taken under the tongue) or lasts longer than 24 hours.  You may have a dry mouth, a sore throat, muscle aches or trouble sleeping.  These should go away after 24 hours.  Do not make important or legal decisions.  6.   Do not drive or use heavy equipment.  If you have weakness or tingling, don't drive or use heavy equipment until this feeling goes away.    To contact a doctor, call   [phone]

## 2024-11-05 NOTE — ANESTHESIA PREPROCEDURE EVALUATION
"Anesthesia Pre-Procedure Evaluation    Patient: Jarad Coughlin   MRN: [id number] : 1970        Procedure : Procedure(s):  Colonoscopy          History reviewed. No pertinent past medical history.   History reviewed. No pertinent surgical history.   No Known Allergies   Social History     Tobacco Use    Smoking status: Never    Smokeless tobacco: Never   Substance Use Topics    Alcohol use: Yes     Comment: occasional      Wt Readings from Last 1 Encounters:   24 104 kg (229 lb 3.2 oz)        Anesthesia Evaluation   Pt has had prior anesthetic.     No history of anesthetic complications       ROS/MED HX  ENT/Pulmonary:  - neg pulmonary ROS     Neurologic:  - neg neurologic ROS     Cardiovascular:       METS/Exercise Tolerance: >4 METS    Hematologic:  - neg hematologic  ROS     Musculoskeletal: Comment: SI Joint Pain      GI/Hepatic:     (+)        bowel prep,            Renal/Genitourinary:  - neg Renal ROS     Endo:  - neg endo ROS     Psychiatric/Substance Use:  - neg psychiatric ROS     Infectious Disease:  - neg infectious disease ROS     Malignancy:  - neg malignancy ROS     Other:  - neg other ROS          Physical Exam    Airway        Mallampati: II   TM distance: > 3 FB   Neck ROM: full   Mouth opening: > 3 cm    Respiratory Devices and Support         Dental       (+) Minor Abnormalities - some fillings, tiny chips      Cardiovascular   cardiovascular exam normal       Rhythm and rate: regular and normal     Pulmonary   pulmonary exam normal        breath sounds clear to auscultation           OUTSIDE LABS:  CBC:   Lab Results   Component Value Date    WBC 6.1 2024    HGB 14.7 2024    HCT 44.4 2024     2024     BMP:   Lab Results   Component Value Date     2024    POTASSIUM 4.8 2024    CHLORIDE 101 2024    CO2 30 (H) 2024    BUN 11.0 2024    CR 0.91 2024     (H) 2024     COAGS: No results found for: \"PTT\", " "\"INR\", \"FIBR\"  POC: No results found for: \"BGM\", \"HCG\", \"HCGS\"  HEPATIC:   Lab Results   Component Value Date    ALBUMIN 4.6 08/20/2024    PROTTOTAL 7.5 08/20/2024    ALT 61 08/20/2024    AST 48 (H) 08/20/2024    ALKPHOS 48 08/20/2024    BILITOTAL 1.0 08/20/2024     OTHER:   Lab Results   Component Value Date    LILIAN 9.7 08/20/2024       Anesthesia Plan    ASA Status:  1    NPO Status:  NPO Appropriate    Anesthesia Type: MAC.              Consents    Anesthesia Plan(s) and associated risks, benefits, and realistic alternatives discussed. Questions answered and patient/representative(s) expressed understanding.     - Discussed: Risks, Benefits and Alternatives for BOTH SEDATION and the PROCEDURE were discussed     - Discussed with:  Patient      - Extended Intubation/Ventilatory Support Discussed: No.      - Patient is DNR/DNI Status: No     Use of blood products discussed: No .     Postoperative Care            Comments:               KADEEM Torres CRNA    I have reviewed the pertinent notes and labs in the chart from the past 30 days and (re)examined the patient.  Any updates or changes from those notes are reflected in this note.                                   "

## 2024-11-05 NOTE — OP NOTE
COLONOSCOPY PROCEDURE NOTE    DATE OF SERVICE: 11/5/2024    SURGEON: EDMOND Owens MD     PRE-OP DIAGNOSIS:    Screening for colon cancer     POST-OP DIAGNOSIS:    Normal Exam    PROCEDURE:   Colonoscopy    ASSISTANT:  Yesseniaulator: Susan Mendoza RN  Scrub Person: Trinity Gonzalez    ANESTHESIA:  MAC                            MACCRNA Independent: Miah Aceves APRN CRNA    INDICATION FOR THE PROCEDURE: The patient is a 54 year old male in need of screening colonoscopy. The patient has no other complaints.  After explaining the risks to include bleeding, perforation, potential inability to reach the cecum the patient wishes to proceed.    PROCEDURE: After adequate sedation, the patient was in the left lateral decubitus position.  Rectal exam was performed.  There was normal tone and no palpable masses.  The colonoscope was introduced into the rectum and advanced to the cecum with Mild difficulty.  The patient's prep was excellent.  The terminal cecum was reached.  The cecum, ascending, transverse, descending and sigmoid colon were grossly unremarkable.  The scope was retroflexed in the rectum.  The anorectal junction was unremarkable.  The scope was straightened and removed.  The patient tolerated the procedure well.     ESTIMATED BLOOD LOSS: none    COMPLICATIONS:  None    TISSUE REMOVED:  No    RECOMMEND:    Follow-up in 10 years      EDMOND Owens MD

## 2024-11-05 NOTE — ANESTHESIA POSTPROCEDURE EVALUATION
Patient: Jarad Coughlin    Procedure: Procedure(s):  Colonoscopy       Anesthesia Type:  MAC    Note:  Disposition: Outpatient   Postop Pain Control: Uneventful            Sign Out: Well controlled pain   PONV: No   Neuro/Psych: Uneventful            Sign Out: Acceptable/Baseline neuro status   Airway/Respiratory: Uneventful            Sign Out: Acceptable/Baseline resp. status   CV/Hemodynamics: Uneventful            Sign Out: Acceptable CV status; No obvious hypovolemia; No obvious fluid overload   Other NRE: NONE   DID A NON-ROUTINE EVENT OCCUR?            Last vitals:  Vitals Value Taken Time   /92 11/05/24 1418   Temp 96.8  F (36  C) 11/05/24 1415   Pulse 70 11/05/24 1418   Resp 15 11/05/24 1415   SpO2 99 % 11/05/24 1419   Vitals shown include unfiled device data.    Electronically Signed By: KADEEM Torres CRNA  November 5, 2024  2:33 PM

## 2024-11-05 NOTE — ANESTHESIA CARE TRANSFER NOTE
Patient: Jarad Coughlin    Procedure: Procedure(s):  Colonoscopy       Diagnosis: Screen for colon cancer [Z12.11]  Diagnosis Additional Information: No value filed.    Anesthesia Type:   MAC     Note:    Oropharynx: oropharynx clear of all foreign objects and spontaneously breathing  Level of Consciousness: drowsy  Oxygen Supplementation: room air    Independent Airway: airway patency satisfactory and stable  Dentition: dentition unchanged  Vital Signs Stable: post-procedure vital signs reviewed and stable  Report to RN Given: handoff report given  Patient transferred to: Phase II    Handoff Report: Identifed the Patient, Identified the Reponsible Provider, Reviewed the pertinent medical history, Discussed the surgical course, Reviewed Intra-OP anesthesia mangement and issues during anesthesia, Set expectations for post-procedure period and Allowed opportunity for questions and acknowledgement of understanding      Vitals:  Vitals Value Taken Time   BP     Temp     Pulse     Resp     SpO2         Electronically Signed By: KADEEM PALOMARES CRNA  November 5, 2024  1:55 PM

## 2024-11-05 NOTE — OR NURSING
patient has been discharged to home at 1430 via ambulation accompanied by RN and spouse.    Written discharge instructions were provided to patient and spouse.  Prescriptions were not ordered.  Patient states their pain is 0/10, and denies any nausea or dizziness upon discharge.    Patient and adult caring for them verbalize understanding of discharge instructions including no driving until tomorrow and no longer taking narcotic pain medications - no operating mechanical equipment and no making any important decisions.They understand reason for discharge, and necessary follow-up appointments.

## (undated) DEVICE — TUBING SUCTION 10'X3/16" N510

## (undated) DEVICE — ENDO BRUSH CHANNEL MASTER CLEANING 2-4.2MM BW-412T

## (undated) DEVICE — SUCTION MANIFOLD NEPTUNE 2 SYS 4 PORT 0702-020-000

## (undated) DEVICE — ENDO KIT COMPLIANCE DYKENDOCMPLY

## (undated) DEVICE — SOL WATER 1500ML

## (undated) RX ORDER — DEXAMETHASONE SODIUM PHOSPHATE 10 MG/ML
INJECTION, SOLUTION INTRAMUSCULAR; INTRAVENOUS
Status: DISPENSED
Start: 2023-08-09

## (undated) RX ORDER — PROPOFOL 10 MG/ML
INJECTION, EMULSION INTRAVENOUS
Status: DISPENSED
Start: 2024-11-05

## (undated) RX ORDER — LIDOCAINE HYDROCHLORIDE 10 MG/ML
INJECTION, SOLUTION INFILTRATION; PERINEURAL
Status: DISPENSED
Start: 2023-08-09